# Patient Record
Sex: FEMALE | Race: WHITE | NOT HISPANIC OR LATINO
[De-identification: names, ages, dates, MRNs, and addresses within clinical notes are randomized per-mention and may not be internally consistent; named-entity substitution may affect disease eponyms.]

---

## 2021-06-18 PROBLEM — Z00.00 ENCOUNTER FOR PREVENTIVE HEALTH EXAMINATION: Status: ACTIVE | Noted: 2021-06-18

## 2021-08-05 ENCOUNTER — APPOINTMENT (OUTPATIENT)
Dept: NEUROSURGERY | Facility: CLINIC | Age: 31
End: 2021-08-05

## 2021-11-16 ENCOUNTER — APPOINTMENT (OUTPATIENT)
Dept: NEUROSURGERY | Facility: CLINIC | Age: 31
End: 2021-11-16

## 2021-11-16 ENCOUNTER — APPOINTMENT (OUTPATIENT)
Dept: NEUROSURGERY | Facility: CLINIC | Age: 31
End: 2021-11-16
Payer: SELF-PAY

## 2021-11-16 PROCEDURE — EDU01: CPT

## 2021-11-17 ENCOUNTER — TRANSCRIPTION ENCOUNTER (OUTPATIENT)
Age: 31
End: 2021-11-17

## 2021-11-18 ENCOUNTER — TRANSCRIPTION ENCOUNTER (OUTPATIENT)
Age: 31
End: 2021-11-18

## 2021-11-18 NOTE — HISTORY OF PRESENT ILLNESS
[de-identified] : Ms. MAC is a pleasant 31 year old female who presents today with a chief complaint of RIGHT ear pulsatile tinnitus.  It first started after a course of doxycycline for acne.  Since that time it has been getting progressively worse.  It is described as a constant, whooshing sound that is in sync with her pulse.  Pressing on the RIGHT side of her neck improves the sound.  It is very bothersome to her, causing her to inability to sleep well.\par \par She has occasional headaches.  She had imaging that suggested a possible diagnosis of IIH.  \par \par Denies blurry vision or diplopia, has seen ophthalmology who did not find papilledema.\par \par Current Meds - Diamox 1000mg Daily and Lasix, does not feel like it has improved her pulsatile tinnitus\par \par She had an LP with an opening pressure of 23cm H2O while on Diamox.\par \par Occasional rhinorrhea and otorrhea but CSF Leak has not been confirmed\par

## 2021-11-18 NOTE — REASON FOR VISIT
[Home] : at home, [unfilled] , at the time of the visit. [Medical Office: ([facility])___] : at the medical office located in  [Verbal consent obtained from patient] : the patient, [unfilled] [New Patient Visit] : a new patient visit [FreeTextEntry1] : Pulsatile Tinnitus

## 2021-11-18 NOTE — ASSESSMENT
[FreeTextEntry1] : Formulation::\par RIGHT Pulsatile Tinnitus\par Improves with ipsilateral neck pressure\par Occasional Headaches\par No Vision Complaints or papilledema\par LP with borderline elevated opening pressure\par \par We discussed possible causes of pulsatile tinnitus including:\par ENT causes such as semicircular canal dehiscence, tumor, ototoxicity\par Cardiac causes such as aortic stenosis, valve disease, HTN, other causes of heart murmur\par Anemia\par Thyroid disease\par Cerebrovascular causes such as arteriovenous malformation (AVM), dural arteriovenous fistula (dAVF), venous sinus stenosis, venous aneurysm, large trans-mastoid emissary vein, carotid stenosis, jugular bulb diverticulum \par \par CTV shows stenosis of bilateral transverse venous sinus stenosis. The RIGHT transverse sinus is dominant. There is also post-stenotic venous dilatation/ venous aneurysm.\par \par \par IMPRESSION\par NILTON MAC suffers from pulsatile tinnitus from venous origin. 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 This is a sequel of chronic severe stenosis at the junction of the transverse and sigmoid venous sinuses. We discussed the natural history of intracranial venous aneurysms and I explained to the patient that these aneurysms DO NOT cause intracranial hemorrhage or stroke. \par \par The pulsatile tinnitus is severe and has a negative impact in social life, work and daily living. We discussed treatment options which include observation, trial of Diamox, endovascular treatment, open surgery.\par \par The patient would like to proceed with endovascular treatment. Treatment includes stent -assisted embolization of the wide-neck venous aneurysm of the proximal sigmoid venous sinus. We discussed with the patient my extensive personal experience with this treatment and the results of a recent clinical trial (Eb et al, Interventional Neuroradiology 2020). We discussed the procedure that has two components. The first part consists of catheter angiogram and manometry to assess the degree of stenosis and confirm the presence of the venous aneurysm. This part is typically performed with the patient awake. The second part consists of embolization of the venous aneurysm utilizing stent, coils or both and is performed under general anesthesia.\par \par The risks, benefits and alternatives of the procedure were discussed with the patient in detail. In my personal experience, the risks are very rare, but the possibility is not zero. Risks include stroke, brain hemorrhage, any type of disability (facial or extremity weakness, facial or extremity numbness, speech difficulties, blindness) and death. There are also possible complications related to the incisions such as infection, pain, swelling and bleeding.\par \par The patient is aware that the procedure requires dual antiplatelet therapy for 1-month post-stent (typically aspirin 325 mg daily and clopidogrel 75 mg daily) and antiplatelet monotherapy (typically aspirin 325 mg daily) for additional 11 months post-stent.\par \par \par PLAN\par Embolization of wide-neck venous aneurysm (.R sigmoid)\par History of myasthenia; will need clearance from her Neurologist regarding precautions for General Anesthesia \par \par

## 2022-02-02 ENCOUNTER — TRANSCRIPTION ENCOUNTER (OUTPATIENT)
Age: 32
End: 2022-02-02

## 2022-02-04 ENCOUNTER — TRANSCRIPTION ENCOUNTER (OUTPATIENT)
Age: 32
End: 2022-02-04

## 2022-02-23 ENCOUNTER — OUTPATIENT (OUTPATIENT)
Dept: OUTPATIENT SERVICES | Facility: HOSPITAL | Age: 32
LOS: 1 days | End: 2022-02-23
Payer: COMMERCIAL

## 2022-02-23 DIAGNOSIS — Z11.52 ENCOUNTER FOR SCREENING FOR COVID-19: ICD-10-CM

## 2022-02-23 LAB — SARS-COV-2 RNA SPEC QL NAA+PROBE: SIGNIFICANT CHANGE UP

## 2022-02-23 PROCEDURE — U0003: CPT

## 2022-02-23 PROCEDURE — C9803: CPT

## 2022-02-23 PROCEDURE — U0005: CPT

## 2022-02-24 ENCOUNTER — OUTPATIENT (OUTPATIENT)
Dept: OUTPATIENT SERVICES | Facility: HOSPITAL | Age: 32
LOS: 1 days | End: 2022-02-24
Payer: COMMERCIAL

## 2022-02-24 ENCOUNTER — APPOINTMENT (OUTPATIENT)
Dept: NEUROSURGERY | Facility: CLINIC | Age: 32
End: 2022-02-24
Payer: COMMERCIAL

## 2022-02-24 VITALS
BODY MASS INDEX: 21.44 KG/M2 | SYSTOLIC BLOOD PRESSURE: 116 MMHG | WEIGHT: 121 LBS | DIASTOLIC BLOOD PRESSURE: 66 MMHG | HEART RATE: 62 BPM | TEMPERATURE: 97.8 F | OXYGEN SATURATION: 100 % | HEIGHT: 63 IN

## 2022-02-24 VITALS
TEMPERATURE: 97 F | HEART RATE: 70 BPM | SYSTOLIC BLOOD PRESSURE: 120 MMHG | WEIGHT: 121.03 LBS | OXYGEN SATURATION: 98 % | DIASTOLIC BLOOD PRESSURE: 84 MMHG | HEIGHT: 63 IN | RESPIRATION RATE: 16 BRPM

## 2022-02-24 DIAGNOSIS — H93.A9 PULSATILE TINNITUS, UNSPECIFIED EAR: ICD-10-CM

## 2022-02-24 DIAGNOSIS — Z29.9 ENCOUNTER FOR PROPHYLACTIC MEASURES, UNSPECIFIED: ICD-10-CM

## 2022-02-24 DIAGNOSIS — Z98.890 OTHER SPECIFIED POSTPROCEDURAL STATES: Chronic | ICD-10-CM

## 2022-02-24 LAB
ANION GAP SERPL CALC-SCNC: 14 MMOL/L — SIGNIFICANT CHANGE UP (ref 5–17)
APTT BLD: 36.8 SEC — HIGH (ref 27.5–35.5)
BLD GP AB SCN SERPL QL: NEGATIVE — SIGNIFICANT CHANGE UP
BUN SERPL-MCNC: 7 MG/DL — SIGNIFICANT CHANGE UP (ref 7–23)
CALCIUM SERPL-MCNC: 9.7 MG/DL — SIGNIFICANT CHANGE UP (ref 8.4–10.5)
CHLORIDE SERPL-SCNC: 105 MMOL/L — SIGNIFICANT CHANGE UP (ref 96–108)
CO2 SERPL-SCNC: 22 MMOL/L — SIGNIFICANT CHANGE UP (ref 22–31)
CREAT SERPL-MCNC: 0.4 MG/DL — LOW (ref 0.5–1.3)
GLUCOSE SERPL-MCNC: 92 MG/DL — SIGNIFICANT CHANGE UP (ref 70–99)
HCT VFR BLD CALC: 44 % — SIGNIFICANT CHANGE UP (ref 34.5–45)
HGB BLD-MCNC: 14 G/DL — SIGNIFICANT CHANGE UP (ref 11.5–15.5)
INR BLD: 1.04 RATIO — SIGNIFICANT CHANGE UP (ref 0.88–1.16)
MCHC RBC-ENTMCNC: 28.7 PG — SIGNIFICANT CHANGE UP (ref 27–34)
MCHC RBC-ENTMCNC: 31.8 GM/DL — LOW (ref 32–36)
MCV RBC AUTO: 90.2 FL — SIGNIFICANT CHANGE UP (ref 80–100)
NRBC # BLD: 0 /100 WBCS — SIGNIFICANT CHANGE UP (ref 0–0)
PLATELET # BLD AUTO: 335 K/UL — SIGNIFICANT CHANGE UP (ref 150–400)
POTASSIUM SERPL-MCNC: 4 MMOL/L — SIGNIFICANT CHANGE UP (ref 3.5–5.3)
POTASSIUM SERPL-SCNC: 4 MMOL/L — SIGNIFICANT CHANGE UP (ref 3.5–5.3)
PROTHROM AB SERPL-ACNC: 12.5 SEC — SIGNIFICANT CHANGE UP (ref 10.5–13.4)
RBC # BLD: 4.88 M/UL — SIGNIFICANT CHANGE UP (ref 3.8–5.2)
RBC # FLD: 12.5 % — SIGNIFICANT CHANGE UP (ref 10.3–14.5)
RH IG SCN BLD-IMP: POSITIVE — SIGNIFICANT CHANGE UP
SODIUM SERPL-SCNC: 141 MMOL/L — SIGNIFICANT CHANGE UP (ref 135–145)
WBC # BLD: 4.51 K/UL — SIGNIFICANT CHANGE UP (ref 3.8–10.5)
WBC # FLD AUTO: 4.51 K/UL — SIGNIFICANT CHANGE UP (ref 3.8–10.5)

## 2022-02-24 PROCEDURE — 80048 BASIC METABOLIC PNL TOTAL CA: CPT

## 2022-02-24 PROCEDURE — G0463: CPT

## 2022-02-24 PROCEDURE — 36415 COLL VENOUS BLD VENIPUNCTURE: CPT

## 2022-02-24 PROCEDURE — 86901 BLOOD TYPING SEROLOGIC RH(D): CPT

## 2022-02-24 PROCEDURE — 86900 BLOOD TYPING SEROLOGIC ABO: CPT

## 2022-02-24 PROCEDURE — 99203 OFFICE O/P NEW LOW 30 MIN: CPT

## 2022-02-24 PROCEDURE — 85730 THROMBOPLASTIN TIME PARTIAL: CPT

## 2022-02-24 PROCEDURE — 85610 PROTHROMBIN TIME: CPT

## 2022-02-24 PROCEDURE — 85027 COMPLETE CBC AUTOMATED: CPT

## 2022-02-24 PROCEDURE — 86850 RBC ANTIBODY SCREEN: CPT

## 2022-02-24 NOTE — PHYSICAL EXAM
[General Appearance - Alert] : alert [General Appearance - In No Acute Distress] : in no acute distress [General Appearance - Well Nourished] : well nourished [General Appearance - Well Developed] : well developed [Oriented To Time, Place, And Person] : oriented to person, place, and time [Impaired Insight] : insight and judgment were intact [Affect] : the affect was normal [Motor Tone] : muscle tone was normal in all four extremities [Sensation Tactile Decrease] : light touch was intact [Sclera] : the sclera and conjunctiva were normal [PERRL With Normal Accommodation] : pupils were equal in size, round, reactive to light, with normal accommodation [Outer Ear] : the ears and nose were normal in appearance [Hearing Threshold Finger Rub Not [place]] : hearing was normal [Neck Appearance] : the appearance of the neck was normal [Neck Cervical Mass (___cm)] : no neck mass was observed [] : no respiratory distress [Exaggerated Use Of Accessory Muscles For Inspiration] : no accessory muscle use

## 2022-02-24 NOTE — H&P PST ADULT - MUSCULOSKELETAL
negative No joint pain, swelling or deformity; no limitation of movement ROM intact/no calf tenderness detailed exam

## 2022-02-24 NOTE — H&P PST ADULT - PROBLEM SELECTOR PLAN 1
planned for stent assisted embolization of Venous Aneurysm on 2/25/22.   PST labs send  preprocedure surgical scrub instructions discussed   continue aspirin and Plavix am of sx  last neuro note to be obtained planned for stent assisted embolization of Venous Aneurysm on 2/25/22.   PST labs send  preprocedure surgical scrub instructions discussed   continue aspirin and Plavix am of sx  last neuro note to be obtained  last medical eval to be obtained

## 2022-02-24 NOTE — H&P PST ADULT - ASSESSMENT
VICTOR MANUELI VTE 2.0 SCORE [CLOT updated 2019]    AGE RELATED RISK FACTORS                                                       MOBILITY RELATED FACTORS  [ ] Age 41-60 years                                            (1 Point)                    [ ] Bed rest                                                        (1 Point)  [ ] Age: 61-74 years                                           (2 Points)                  [ ] Plaster cast                                                   (2 Points)  [ ] Age= 75 years                                              (3 Points)                    [ ] Bed bound for more than 72 hours                 (2 Points)    DISEASE RELATED RISK FACTORS                                               GENDER SPECIFIC FACTORS  [ ] Edema in the lower extremities                       (1 Point)              [ ] Pregnancy                                                     (1 Point)  [ ] Varicose veins                                               (1 Point)                     [ ] Post-partum < 6 weeks                                   (1 Point)             [ ] BMI > 25 Kg/m2                                            (1 Point)                     [ ] Hormonal therapy  or oral contraception          (1 Point)                 [ ] Sepsis (in the previous month)                        (1 Point)               [ ] History of pregnancy complications                 (1 point)  [ ] Pneumonia or serious lung disease                                               [ ] Unexplained or recurrent                     (1 Point)           (in the previous month)                               (1 Point)  [ ] Abnormal pulmonary function test                     (1 Point)                 SURGERY RELATED RISK FACTORS  [ ] Acute myocardial infarction                              (1 Point)               [ ]  Section                                             (1 Point)  [ ] Congestive heart failure (in the previous month)  (1 Point)      [ ] Minor surgery                                                  (1 Point)   [ ] Inflammatory bowel disease                             (1 Point)               [ ] Arthroscopic surgery                                        (2 Points)  [ ] Central venous access                                      (2 Points)                [ x] General surgery lasting more than 45 minutes (2 points)  [ ] Malignancy- Present or previous                   (2 Points)                [ ] Elective arthroplasty                                         (5 points)    [ ] Stroke (in the previous month)                          (5 Points)                                                                                                                                                           HEMATOLOGY RELATED FACTORS                                                 TRAUMA RELATED RISK FACTORS  [ ] Prior episodes of VTE                                     (3 Points)                [ ] Fracture of the hip, pelvis, or leg                       (5 Points)  [ ] Positive family history for VTE                         (3 Points)             [ ] Acute spinal cord injury (in the previous month)  (5 Points)  [ ] Prothrombin 26825 A                                     (3 Points)               [ ] Paralysis  (less than 1 month)                             (5 Points)  [ ] Factor V Leiden                                             (3 Points)                  [ ] Multiple Trauma within 1 month                        (5 Points)  [ ] Lupus anticoagulants                                     (3 Points)                                                           [ ] Anticardiolipin antibodies                               (3 Points)                                                       [ ] High homocysteine in the blood                      (3 Points)                                             [ ] Other congenital or acquired thrombophilia      (3 Points)                                                [ ] Heparin induced thrombocytopenia                  (3 Points)                                     Total Score [  2        ]

## 2022-02-24 NOTE — H&P PST ADULT - HISTORY OF PRESENT ILLNESS
31 year old female with PMH of mild weakness to bilateral UE/LE since childhood ( stable) ( no diagnosis as per pt) with occasional HAs/ Pulsatile tinnitus planned for stent assisted embolization of Venous Aneurysm on 2/25/22.       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       **Covid test 2/23/22 not detected   denies any recent covid infection or exposure     ***reviewed neurologist note send by email, negative Muscular dystrophy genetic panel and CK normal, no other diagnosis given to her regarding her muscle weakness. Reviewed case with Dr. Davenport. will obtain  recent medical eval.... emailed Dr. Harvey to notify.

## 2022-02-24 NOTE — H&P PST ADULT - NEUROLOGICAL DETAILS
alert and oriented x 3/normal strength alert and oriented x 3/no spontaneous movement bilateral UE normal strength noted/alert and oriented x 3/no spontaneous movement

## 2022-02-24 NOTE — H&P PST ADULT - NEUROLOGICAL COMMENTS
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

## 2022-02-24 NOTE — H&P PST ADULT - WEIGHT IN LBS
Render Post-Care Instructions In Note?: no
Post-Care Instructions: I reviewed with the patient in detail post-care instructions. Patient is to wear sunprotection, and avoid picking at any of the treated lesions. Pt may apply Vaseline to crusted or scabbing areas.
Duration Of Freeze Thaw-Cycle (Seconds): 0
Consent: The patient's consent was obtained including but not limited to risks of crusting, scabbing, blistering, scarring, darker or lighter pigmentary change, recurrence, incomplete removal and infection.
Detail Level: Zone
121

## 2022-02-24 NOTE — H&P PST ADULT - NSICDXPASTMEDICALHX_GEN_ALL_CORE_FT
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

## 2022-02-25 ENCOUNTER — APPOINTMENT (OUTPATIENT)
Dept: NEUROSURGERY | Facility: HOSPITAL | Age: 32
End: 2022-02-25

## 2022-02-25 ENCOUNTER — OUTPATIENT (OUTPATIENT)
Dept: INPATIENT UNIT | Facility: HOSPITAL | Age: 32
LOS: 1 days | End: 2022-02-25
Payer: COMMERCIAL

## 2022-02-25 VITALS
TEMPERATURE: 98 F | HEIGHT: 63 IN | RESPIRATION RATE: 18 BRPM | SYSTOLIC BLOOD PRESSURE: 118 MMHG | OXYGEN SATURATION: 99 % | HEART RATE: 70 BPM | WEIGHT: 123.02 LBS | DIASTOLIC BLOOD PRESSURE: 80 MMHG

## 2022-02-25 VITALS
RESPIRATION RATE: 10 BRPM | SYSTOLIC BLOOD PRESSURE: 124 MMHG | DIASTOLIC BLOOD PRESSURE: 83 MMHG | OXYGEN SATURATION: 100 % | HEART RATE: 64 BPM

## 2022-02-25 DIAGNOSIS — Z98.890 OTHER SPECIFIED POSTPROCEDURAL STATES: Chronic | ICD-10-CM

## 2022-02-25 DIAGNOSIS — H93.A9 PULSATILE TINNITUS, UNSPECIFIED EAR: ICD-10-CM

## 2022-02-25 DIAGNOSIS — I67.6 NONPYOGENIC THROMBOSIS OF INTRACRANIAL VENOUS SYSTEM: ICD-10-CM

## 2022-02-25 PROCEDURE — 36226 PLACE CATH VERTEBRAL ART: CPT

## 2022-02-25 PROCEDURE — C9399: CPT

## 2022-02-25 PROCEDURE — 75894 X-RAYS TRANSCATH THERAPY: CPT

## 2022-02-25 PROCEDURE — 36223 PLACE CATH CAROTID/INOM ART: CPT

## 2022-02-25 PROCEDURE — 36012 PLACE CATHETER IN VEIN: CPT

## 2022-02-25 PROCEDURE — C1894: CPT

## 2022-02-25 PROCEDURE — C1760: CPT

## 2022-02-25 PROCEDURE — 75898 FOLLOW-UP ANGIOGRAPHY: CPT

## 2022-02-25 PROCEDURE — C1769: CPT

## 2022-02-25 PROCEDURE — C1887: CPT

## 2022-02-25 PROCEDURE — 61624 TCAT PERM OCCLS/EMBOLJ CNS: CPT

## 2022-02-25 PROCEDURE — C1876: CPT

## 2022-02-25 RX ORDER — SODIUM CHLORIDE 9 MG/ML
1000 INJECTION INTRAMUSCULAR; INTRAVENOUS; SUBCUTANEOUS
Refills: 0 | Status: DISCONTINUED | OUTPATIENT
Start: 2022-02-25 | End: 2022-02-25

## 2022-02-25 RX ORDER — ONDANSETRON 8 MG/1
4 TABLET, FILM COATED ORAL ONCE
Refills: 0 | Status: COMPLETED | OUTPATIENT
Start: 2022-02-25 | End: 2022-02-25

## 2022-02-25 RX ORDER — ASPIRIN/CALCIUM CARB/MAGNESIUM 324 MG
1 TABLET ORAL
Qty: 0 | Refills: 0 | DISCHARGE

## 2022-02-25 RX ORDER — HYDROMORPHONE HYDROCHLORIDE 2 MG/ML
0.25 INJECTION INTRAMUSCULAR; INTRAVENOUS; SUBCUTANEOUS
Refills: 0 | Status: DISCONTINUED | OUTPATIENT
Start: 2022-02-25 | End: 2022-02-25

## 2022-02-25 RX ORDER — CLOPIDOGREL BISULFATE 75 MG/1
1 TABLET, FILM COATED ORAL
Qty: 0 | Refills: 0 | DISCHARGE

## 2022-02-25 RX ORDER — SODIUM CHLORIDE 9 MG/ML
1000 INJECTION, SOLUTION INTRAVENOUS
Refills: 0 | Status: DISCONTINUED | OUTPATIENT
Start: 2022-02-25 | End: 2022-02-25

## 2022-02-25 RX ORDER — ONDANSETRON 8 MG/1
1 TABLET, FILM COATED ORAL
Qty: 9 | Refills: 0
Start: 2022-02-25 | End: 2022-02-27

## 2022-02-25 RX ADMIN — ONDANSETRON 4 MILLIGRAM(S): 8 TABLET, FILM COATED ORAL at 11:15

## 2022-02-25 NOTE — ASU PREOP CHECKLIST - NS PREOP CHK HIBICLENS NA
The patient has been examined and the H&P has been reviewed:    I concur with the findings and no changes have occurred since H&P was written.    Anesthesia/Surgery risks, benefits and alternative options discussed and understood by patient/family.    ASA 3, mallampati 2        There are no hospital problems to display for this patient.     N/A

## 2022-02-25 NOTE — CHART NOTE - NSCHARTNOTEFT_GEN_A_CORE
Interventional Neuro- Radiology   Procedure Note PA-C    Procedure: Selective Cerebral Angiography   Pre- Procedure Diagnosis:  Post- Procedure Diagnosis:    : Dr Lorrie Parson   Fellow: Dr Dominick Dao   Physician Assistant: Jaqui Weeks PA-C    Nurse:                  Jeannette BURKS  Radiologic Tech:   Deion Sullivan LRT  Anesthesiologist:   Dr Len Salazar   Sheath:      I/Os: EBL less than 10cc  IV fluids:     cc     Urine output     cc    Contrast Omnipaque 240      cc             Vitals: BP         HR      Spo2     %          Preliminary Report:  Using a 5 Armenian long sheath to the right groin under MAC sedation via left vertebral artery,  left internal carotid artery, left external carotid artery, right vertebral artery, right internal carotid artery, right external carotid artery a selective cerebral angiography was performed and demonstrated                       Official note to follow.  Patient tolerated procedure well, hemodynamically stable, no change in neurological status compared to baseline.  Results discussed with neuro ICU team, patient and patient's family. Right groin sheath was removed, manual compression held to hemostasis for 20 minutes, no active bleeding, no hematoma, quick clot and safeguard balloon dressing applied at Interventional Neuro- Radiology   Procedure Note PA-C    Procedure: catheter cerebral venogram, angiogram, manometry and venous sinus stenting of venous aneurysm    Pre- Procedure Diagnosis: venous aneurysm   Post- Procedure Diagnosis:    : Dr Lorrie Parson   Fellow: Dr Dominick Dao   Physician Assistant: KENIA Duque-CARYN    Nurse:                   Jeannette Walker RN  Radiologic Tech:   Deion Sullivan LRT      Germán Trinidad LRT   Anesthesiologist:   Dr Len Salazar   Sheath:                  8 Surinamese short sheath   Sheath:                  5/4 short sheath     I/Os: EBL less than 10cc  IV fluids: 800cc Urine due to void Contrast Omnipaque 240  73cc          Heparin 3,ooo units     Cordis precise 6mm by 40mm      Cordis precise stent 7mm by 40mm        Vitals: /70       HR 69      Spo2 100%          Preliminary Report:  Using a 8 Surinamese short sheath to the right groin under MAC sedation and an 5/4 short sheath was placed under general anesthesia a catheter cerebral angiogram, manometry and treatment of venous aneurysm with 2 stents was performed. Official note to follow.  Patient tolerated procedure well, hemodynamically stable, no change in neurological status compared to baseline.  Results discussed with patient and patient's sister. Right groin sheath was removed, a Vascade device and manual compression held to hemostasis for 15 minutes, no active bleeding, no hematoma, quick clot and safeguard balloon dressing applied at 9:45am. TR band was placed at 9:30am. Disposition IR recovery room and then discharge home.

## 2022-02-25 NOTE — CHART NOTE - NSCHARTNOTEFT_GEN_A_CORE
Interventional Neuro Radiology  Pre-Procedure Note PA-C    This is a 31 year old left hand dominant female with complaints of right ear tinnitus for 1 year, which is constant. Patient presents to Neuro IR for endovascular treatment of venous aneurysm.     Upon exam patient is A + O x 3, PEERL, EOMI, Tongue is midline, speech is fluent, recent and remote memory intact, follows commands, moves all extremities, motor 5/5, ambulates without assist.       Allergies: No Known Allergies  PMHX: Right Pulsatile tinnitus, Chronic headaches, muscle weakness  PSHX: ORIF of fracture of ankle 2021  Social History: non-tobacco   FAMILY HISTORY: non-contributory   Current Medications: ASA 325mg, Plavix 75mg     Labs:                         14.0   4.51  )-----------( 335      ( 24 Feb 2022 11:18 )             44.0       02-24    141  |  105  |  7   ----------------------------<  92  4.0   |  22  |  0.40<L>    Ca    9.7      24 Feb 2022 11:17      Blood Bank: A positive available       Assessment/Plan:   This is a 31 year old left hand dominant female who presents to Neuro-IR for a catheter cerebral venogram, angiogram, manometry and treatment of venous aneurysm. Procedure, goals, risks, benefits and alternatives were discussed with patient and patient's sister.  All questions were answered.  Risks include but are not limited to stroke, vessel injury, hemorrhage, and or right groin hematoma. Patient demonstrates understanding of all risks involved with this procedure and wishes to continue. Appropriate consent was obtained from patient and consent is in the patient's chart.

## 2022-02-25 NOTE — ASU DISCHARGE PLAN (ADULT/PEDIATRIC) - ASU DC SPECIAL INSTRUCTIONSFT
Please take Aspirin 325mg and Plavix 75mg daily     Please take medrol 4mg dosepak   6 tabs Saturday   5 tabs Sunday   4 tabs Monday   3 tabs Tuesday   2 tabs Wednesday   1 tab   Thursday and done

## 2022-02-25 NOTE — ASU DISCHARGE PLAN (ADULT/PEDIATRIC) - CARE PROVIDER_API CALL
Lorrie Parson; MPH)  Radiology  [address], [address]  [place] [address]  Phone: [phone]  Fax: [phone]  Follow Up Time:

## 2022-02-25 NOTE — ASU PATIENT PROFILE, ADULT - NSICDXPASTMEDICALHX_GEN_ALL_CORE_FT
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

## 2022-02-25 NOTE — ASU PREOP CHECKLIST - NS PREOP CHK TEST_COVID_DT_GEN_ALL_CORE
Ok for 30 day supply of previous sertraline . She can continue daily probiotic  Carolyn Tena to follow up for additional refills and management    23-Feb-2022

## 2022-02-25 NOTE — ASU DISCHARGE PLAN (ADULT/PEDIATRIC) - NURSING INSTRUCTIONS
Please feel free to contact us at [phone] if any problems arise. After 6PM, Monday through Friday, on weekends and on holidays, please call [phone] and ask for the radiology resident on call to be paged.

## 2022-02-25 NOTE — ASSESSMENT
[FreeTextEntry1] : Formulation::\par RIGHT Pulsatile Tinnitus\par Improves with ipsilateral neck pressure\par Occasional Headaches\par No Vision Complaints or papilledema\par LP with borderline elevated opening pressure and had improvement in her pulsatile tinitus after the LP\par \par We discussed possible causes of pulsatile tinnitus including:\par ENT causes such as semicircular canal dehiscence, tumor, ototoxicity\par Cardiac causes such as aortic stenosis, valve disease, HTN, other causes of heart murmur\par Anemia\par Thyroid disease\par Cerebrovascular causes such as arteriovenous malformation (AVM), dural arteriovenous fistula (dAVF), venous sinus stenosis, venous aneurysm, large trans-mastoid emissary vein, carotid stenosis, jugular bulb diverticulum \par \par CTV shows stenosis of bilateral transverse venous sinus stenosis. \par The RIGHT transverse sinus is dominant. \par There is also post-stenotic venous dilatation/ venous aneurysm.\par \par IMPRESSION\par NILTON MAC suffers from pulsatile tinnitus from venous origin. 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 This is a sequel of chronic severe stenosis at the junction of the transverse and sigmoid venous sinuses. We discussed the natural history of intracranial venous aneurysms and I explained to the patient that these aneurysms DO NOT cause intracranial hemorrhage or stroke. \par \par The pulsatile tinnitus is severe and has a negative impact in social life, work and daily living. We discussed treatment options which include observation, trial of Diamox, endovascular treatment, open surgery.\par \par The patient is scheduled for endovascular treatment tomorrow. Treatment includes stent -assisted embolization of the wide-neck venous aneurysm of the proximal sigmoid venous sinus. We discussed with the patient my extensive personal experience with this treatment and the results of a recent clinical trial (Eb et al, Interventional Neuroradiology 2020). We discussed the procedure that has two components. The first part consists of catheter angiogram and manometry to assess the degree of stenosis and confirm the presence of the venous aneurysm. This part is typically performed with the patient awake. The second part consists of embolization of the venous aneurysm utilizing stent, coils or both and is performed under general anesthesia.\par \par The risks, benefits and alternatives of the procedure were  again discussed with the patient and her sister in detail today.. In my personal experience, the risks are very rare, but the possibility is not zero. Risks include stroke, brain hemorrhage, any type of disability (facial or extremity weakness, facial or extremity numbness, speech difficulties, blindness) and death. There are also possible complications related to the incisions such as infection, pain, swelling and bleeding.\par \par The patient started   mg and Plavic 75 mg 4 days ago and aware that the procedure requires dual antiplatelet therapy for 1-month post-stent (typically aspirin 325 mg daily and clopidogrel 75 mg daily) and antiplatelet monotherapy (typically aspirin 325 mg daily) for additional 11 months post-stent.\par \par PLAN\par Venous Stenting and Embolization of wide-neck venous aneurysm (R sigmoid). \par All questions answered today and she would like to proceed as planned\par Continue aspirin and plavix

## 2022-02-25 NOTE — ASU DISCHARGE PLAN (ADULT/PEDIATRIC) - PROVIDER TOKENS
----- Message from Sonia Melgoza sent at 10/27/2020  9:31 AM CDT -----  Contact: Pt called  Pt need medication metoprolol succinate (TOPROL-XL) 100 MG 24 hr tablet and resend to [id number] - [place], [place] - [phone] (Fax)[address] AT [facility] [address] [phone] (Phone). Thank you.         PROVIDER:[TOKEN:[[id number]]]

## 2022-02-25 NOTE — PRE-ANESTHESIA EVALUATION ADULT - NSANTHADDINFOFT_GEN_ALL_CORE
r+b discussed with patient  vague history of muscle weakness, negative workup for muscular dystrophy  will start with light sedation then proceed with geta, will not use succinylcholine

## 2022-02-25 NOTE — ASU DISCHARGE PLAN (ADULT/PEDIATRIC) - NS MD DC FALL RISK RISK
For information on Fall & Injury Prevention, visit: [email or web address] OR  [email or web address] OR  https://www.cdc.gov/steadi/patient.html

## 2022-02-28 ENCOUNTER — RX RENEWAL (OUTPATIENT)
Age: 32
End: 2022-02-28

## 2022-02-28 PROBLEM — H93.A9 PULSATILE TINNITUS, UNSPECIFIED EAR: Chronic | Status: ACTIVE | Noted: 2022-02-24

## 2022-02-28 PROBLEM — Z87.39 PERSONAL HISTORY OF OTHER DISEASES OF THE MUSCULOSKELETAL SYSTEM AND CONNECTIVE TISSUE: Chronic | Status: ACTIVE | Noted: 2022-02-24

## 2022-02-28 PROBLEM — R51.9 HEADACHE, UNSPECIFIED: Chronic | Status: ACTIVE | Noted: 2022-02-24

## 2022-03-01 ENCOUNTER — APPOINTMENT (OUTPATIENT)
Dept: NEUROSURGERY | Facility: CLINIC | Age: 32
End: 2022-03-01
Payer: COMMERCIAL

## 2022-03-01 VITALS
SYSTOLIC BLOOD PRESSURE: 125 MMHG | WEIGHT: 121 LBS | HEIGHT: 63 IN | DIASTOLIC BLOOD PRESSURE: 76 MMHG | OXYGEN SATURATION: 99 % | BODY MASS INDEX: 21.44 KG/M2 | HEART RATE: 52 BPM

## 2022-03-01 PROCEDURE — 99213 OFFICE O/P EST LOW 20 MIN: CPT

## 2022-03-07 NOTE — ASSESSMENT
[FreeTextEntry1] : Impression:\par POD #4 s/p Successful Venous Sinus Stenting\par Very minimal Post op right-sided headaches\par Pulsatile Tinnitus Completely Resolved\par Feels much better than she did prior to procedure\par Puncture Sites Healing Well\par Neurologically Intact\par \par \par Plan:\par Continue ASA 325mg and Plavix 75mg Daily\par Continue Medrol Elmo \par Follow Up with NP in 4 weeks \par MRV Head w/wo in 5/2022

## 2022-03-07 NOTE — PHYSICAL EXAM
[General Appearance - Alert] : alert [General Appearance - In No Acute Distress] : in no acute distress [General Appearance - Well-Appearing] : healthy appearing [Oriented To Time, Place, And Person] : oriented to person, place, and time [Person] : oriented to person [Place] : oriented to place [Time] : oriented to time [Sclera] : the sclera and conjunctiva were normal [Outer Ear] : the ears and nose were normal in appearance [Neck Appearance] : the appearance of the neck was normal [] : no respiratory distress [Heart Rate And Rhythm] : heart rate was normal and rhythm regular [Abnormal Walk] : normal gait [Skin Color & Pigmentation] : normal skin color and pigmentation [FreeTextEntry1] : Right Groin Puncture site - ELISEO, Clean/dry/intact, no tenderness, mild ecchymosis, no drainage; Right radial puncture site - ELISEO, clean/dry/intact, no tenderness, mild ecchymosis, no drainage, cap refill <3 sec, no forearm tenderness or numbness/tingling in right fingers

## 2022-03-07 NOTE — HISTORY OF PRESENT ILLNESS
[FreeTextEntry1] : Ms. MAC is a pleasant 32yo female who presents today after successful venous sinus stenting on 2/25/22 for IIH.  The patient went home the same day in good condition.  Her pulsatile tinnitus has completely resolved.  She also states that her neck pain is much better than prior to the procedure.  She reports minimal right-sided headaches since the procedure that are described as different than the headaches she was experiencing prior to the procedure.  She was discharged on a medrol dose pack and percocet prn headache.  Her puncture sites are healing well, not painful.  Overall, she states that she feels the best she has felt in the past 1.5 years.\par \par Denies dizziness, visual scintillations, episodes of visual loss or blurring, diplopia, hearing changes, tinnitus, speech problems, swallowing difficulties, numbness, tingling, weakness, tremors, twitches, seizures, imbalance or coordination difficulties  \par \par She remains compliant with ASA 325mg and Plavix 75mg daily.  She stopped taking Diamox and Lasix many weeks prior to the procedure.

## 2022-03-25 ENCOUNTER — APPOINTMENT (OUTPATIENT)
Dept: NEUROSURGERY | Facility: CLINIC | Age: 32
End: 2022-03-25
Payer: COMMERCIAL

## 2022-03-25 PROCEDURE — 99212 OFFICE O/P EST SF 10 MIN: CPT | Mod: 95

## 2022-03-25 RX ORDER — METHYLPREDNISOLONE 4 MG/1
4 TABLET ORAL
Refills: 0 | Status: DISCONTINUED | COMMUNITY
End: 2022-03-25

## 2022-03-25 NOTE — REASON FOR VISIT
[Follow-Up: _____] : a [unfilled] follow-up visit [Home] : at home, [unfilled] , at the time of the visit. [Medical Office: ([facility])___] : at the medical office located in  [Verbal consent obtained from patient] : the patient, [unfilled]

## 2022-03-28 ENCOUNTER — RX RENEWAL (OUTPATIENT)
Age: 32
End: 2022-03-28

## 2022-08-15 ENCOUNTER — RX RENEWAL (OUTPATIENT)
Age: 32
End: 2022-08-15

## 2022-08-15 NOTE — ASSESSMENT
[FreeTextEntry1] : Impression:\par s/p Successful Venous Sinus Stenting on 2/25/22\par Pulsatile Tinnitus and Headaches Completely Resolved\par Feels much better than she did prior to procedure\par \par \par Plan:\par Stop Plavix\par Continue ASA 325mg Daily\par MRV Head w/wo in 5/2022\par Follow Up with Dr. Parson After Imaging

## 2022-08-15 NOTE — HISTORY OF PRESENT ILLNESS
[FreeTextEntry1] : Ms. MAC is a pleasant 30yo female who presents today after successful venous sinus stenting on 2/25/22 for IIH.  Since the procedure, her pulsatile tinnitus has completely resolved.  She also states that her neck pain is much better than prior to the procedure.  Her headaches have also completely resolved.  Her puncture sites are well healed.  Overall, she states that she feels the best she has felt in years.\par \par Denies headaches, dizziness, visual scintillations, episodes of visual loss or blurring, diplopia, hearing changes, tinnitus, speech problems, swallowing difficulties, numbness, tingling, weakness, tremors, twitches, seizures, imbalance or coordination difficulties  \par \par She remains compliant with ASA 325mg and Plavix 75mg daily.

## 2022-08-20 NOTE — H&P PST ADULT - EXTREMITIES
- Working to optimize BG control  - SSI provided for corrective dosing  - DXTs as ordered  - Hypoglycemic protocol in effect  - Diabetic diet provided   No cyanosis, clubbing or edema

## 2023-04-25 ENCOUNTER — APPOINTMENT (OUTPATIENT)
Dept: NEUROSURGERY | Facility: CLINIC | Age: 33
End: 2023-04-25
Payer: COMMERCIAL

## 2023-04-25 DIAGNOSIS — H93.A9 PULSATILE TINNITUS, UNSPECIFIED EAR: ICD-10-CM

## 2023-04-25 PROCEDURE — 99213 OFFICE O/P EST LOW 20 MIN: CPT | Mod: 95

## 2023-04-25 RX ORDER — CLOPIDOGREL BISULFATE 75 MG/1
75 TABLET, FILM COATED ORAL DAILY
Qty: 40 | Refills: 0 | Status: DISCONTINUED | COMMUNITY
Start: 2022-02-04 | End: 2023-04-25

## 2023-04-25 RX ORDER — ASPIRIN 325 MG/1
325 TABLET, FILM COATED ORAL DAILY
Qty: 30 | Refills: 6 | Status: DISCONTINUED | COMMUNITY
Start: 2022-02-04 | End: 2023-04-25

## 2023-04-25 RX ORDER — EUCALYP/ME-SALICYLATE/MEN/THYM
325 MOUTHWASH MUCOUS MEMBRANE DAILY
Qty: 90 | Refills: 2 | Status: DISCONTINUED | COMMUNITY
Start: 2022-08-15 | End: 2023-04-25

## 2023-04-25 NOTE — REASON FOR VISIT
[Follow-Up: _____] : a [unfilled] follow-up visit [Home] : at home, [unfilled] , at the time of the visit. [Medical Office: ([facility])___] : at the medical office located in  [Verbal consent obtained from patient] : the patient, [unfilled]

## 2023-04-25 NOTE — REASON FOR VISIT
[Follow-Up: _____] : a [unfilled] follow-up visit [Home] : at home, [unfilled] , at the time of the visit. [Medical Office: ([facility])___] : at the medical office located in  [Verbal consent obtained from patient] : the patient, [unfilled]

## 2023-04-28 NOTE — ASSESSMENT
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 Parson April 2024

## 2023-04-28 NOTE — HISTORY OF PRESENT ILLNESS
[FreeTextEntry1] : Ms. MAC is a pleasant 33yo female who presents today after successful venous sinus stenting on 2/25/22 for IIH.  Since the procedure, her pulsatile tinnitus has completely resolved.  She also states that her neck pain is much better than prior to the procedure.  Her headaches have also completely resolved.  Her puncture sites are well healed.  Overall, she states that she feels the best she has felt in years.\par \par Denies headaches, dizziness, visual scintillations, episodes of visual loss or blurring, diplopia, hearing changes, tinnitus, speech problems, swallowing difficulties, numbness, tingling, weakness, tremors, twitches, seizures, imbalance or coordination difficulties  \par \par She remains compliant with ASA 325mg daily.

## 2024-02-16 ENCOUNTER — APPOINTMENT (OUTPATIENT)
Dept: NEUROSURGERY | Facility: CLINIC | Age: 34
End: 2024-02-16

## 2024-09-26 ENCOUNTER — APPOINTMENT (OUTPATIENT)
Dept: NEUROSURGERY | Facility: CLINIC | Age: 34
End: 2024-09-26
Payer: COMMERCIAL

## 2024-09-26 DIAGNOSIS — H93.A9 PULSATILE TINNITUS, UNSPECIFIED EAR: ICD-10-CM

## 2024-09-26 PROCEDURE — 99212 OFFICE O/P EST SF 10 MIN: CPT

## 2024-09-26 NOTE — REASON FOR VISIT
[Follow-Up: _____] : a [unfilled] follow-up visit [Home] : at home, [unfilled] , at the time of the visit. [Medical Office: ([facility])___] : at the medical office located in  [Verbal consent obtained from patient] : the patient, [unfilled]

## 2024-09-26 NOTE — HISTORY OF PRESENT ILLNESS
[FreeTextEntry1] : Ms. MAC is a pleasant 35yo female who presents today after successful venous sinus stenting on 2/25/22 for IIH.  After the procedure, her pulsatile tinnitus and headaches completely resolved.    In 7/2024, she had a really bad flu.  After that she started to have right sided ear fullness and recurrent, intermittent right pulsatile tinnitus.  It occurs if she turns to the left and sometimes in the middle of the night.  She also has been having headaches, but notes they usually only occur around menstruation.    She has a normal eye exam in 2/2024.

## 2024-09-26 NOTE — REASON FOR VISIT
[Follow-Up: _____] : a [unfilled] follow-up visit [Home] : at home, [unfilled] , at the time of the visit. [Medical Office: ([facility])___] : at the medical office located in  [Verbal consent obtained from patient] : the patient, [unfilled]

## 2024-09-26 NOTE — ASSESSMENT
[FreeTextEntry1] : Impression: s/p Successful Venous Sinus Stenting on 2/25/22 Recurrent, Occasional Right Pulsatile Tinnitus since last month Headaches - Usually occurring around menstruation Normal Eye Exam 2/2024  MRV 9/2024 reveals patent stent, no new stenosis  I reassured Ms. Yeboah that her MRV does not reveal any stenosis.  I cannot explain her recurrent pulsatile tinnitus, possibly some residual inflammation from the flu she describes, but it is not very bothersome at this time.  If the pulsatile tinnitus becomes more bothersome, we can consider further evaluation with catheter venogram.    PLAN Follow Up with Me As Needed for Worsening Pulsatile Tinnitus or Signs/Symptoms of IIH